# Patient Record
Sex: FEMALE | Race: WHITE | NOT HISPANIC OR LATINO | URBAN - METROPOLITAN AREA
[De-identification: names, ages, dates, MRNs, and addresses within clinical notes are randomized per-mention and may not be internally consistent; named-entity substitution may affect disease eponyms.]

---

## 2019-04-24 ENCOUNTER — IMPORTED ENCOUNTER (OUTPATIENT)
Dept: URBAN - METROPOLITAN AREA CLINIC 50 | Facility: CLINIC | Age: 19
End: 2019-04-24

## 2019-04-25 ENCOUNTER — IMPORTED ENCOUNTER (OUTPATIENT)
Dept: URBAN - METROPOLITAN AREA CLINIC 50 | Facility: CLINIC | Age: 19
End: 2019-04-25

## 2019-04-25 NOTE — PATIENT DISCUSSION
"""Type 1 diabetes without diabetic retinopathy.  Emphasized importance of maintaining a healthy "" 97

## 2019-05-31 ENCOUNTER — IMPORTED ENCOUNTER (OUTPATIENT)
Dept: URBAN - METROPOLITAN AREA CLINIC 50 | Facility: CLINIC | Age: 19
End: 2019-05-31

## 2019-06-21 ENCOUNTER — IMPORTED ENCOUNTER (OUTPATIENT)
Dept: URBAN - METROPOLITAN AREA CLINIC 50 | Facility: CLINIC | Age: 19
End: 2019-06-21

## 2021-04-17 ASSESSMENT — VISUAL ACUITY
OD_CC: 20/30
OS_CC: 20/20-1
OS_PH: 20/25
OD_PH: 20/40
OS_CC: 20/60
OD_CC: 20/60

## 2021-04-17 ASSESSMENT — TONOMETRY
OD_IOP_MMHG: 14
OS_IOP_MMHG: 14